# Patient Record
(demographics unavailable — no encounter records)

---

## 2025-03-25 NOTE — HISTORY OF PRESENT ILLNESS
[de-identified] : Mom was called from school nurse; patient has rash on cheeks, no other complaints [FreeTextEntry6] : Red rash on cheeks starting today, not itchy, has mild fatigue Afebrile No cough, congestion, runny nose, headache, sore throat, body aches Eating, drinking and urinating well Best friend just had Fifth disease

## 2025-03-25 NOTE — DISCUSSION/SUMMARY
[FreeTextEntry1] : Discussed with mother Fifth's disease, etiology and course of illness Rash may spread before fully resolving Note to return to school tomorrow given to mother as no longer contagious once rash has appeared

## 2025-03-25 NOTE — PHYSICAL EXAM
[NL] : moves all extremities x4, warm, well perfused x4 [de-identified] : erythematous slightly francesca rash on both cheeks only